# Patient Record
Sex: MALE | Race: WHITE | NOT HISPANIC OR LATINO | Employment: UNEMPLOYED | URBAN - METROPOLITAN AREA
[De-identification: names, ages, dates, MRNs, and addresses within clinical notes are randomized per-mention and may not be internally consistent; named-entity substitution may affect disease eponyms.]

---

## 2017-03-16 ENCOUNTER — ALLSCRIPTS OFFICE VISIT (OUTPATIENT)
Dept: OTHER | Facility: OTHER | Age: 32
End: 2017-03-16

## 2017-03-24 ENCOUNTER — ALLSCRIPTS OFFICE VISIT (OUTPATIENT)
Dept: OTHER | Facility: OTHER | Age: 32
End: 2017-03-24

## 2018-01-13 VITALS
RESPIRATION RATE: 18 BRPM | HEART RATE: 90 BPM | SYSTOLIC BLOOD PRESSURE: 138 MMHG | WEIGHT: 148.6 LBS | DIASTOLIC BLOOD PRESSURE: 84 MMHG | TEMPERATURE: 97.6 F | HEIGHT: 66 IN | BODY MASS INDEX: 23.88 KG/M2

## 2018-01-14 VITALS
TEMPERATURE: 97.8 F | RESPIRATION RATE: 18 BRPM | DIASTOLIC BLOOD PRESSURE: 80 MMHG | HEIGHT: 66 IN | BODY MASS INDEX: 23.75 KG/M2 | WEIGHT: 147.8 LBS | HEART RATE: 84 BPM | SYSTOLIC BLOOD PRESSURE: 140 MMHG | OXYGEN SATURATION: 98 %

## 2018-06-26 ENCOUNTER — APPOINTMENT (OUTPATIENT)
Dept: RADIOLOGY | Facility: CLINIC | Age: 33
End: 2018-06-26
Payer: COMMERCIAL

## 2018-06-26 ENCOUNTER — TRANSCRIBE ORDERS (OUTPATIENT)
Dept: RADIOLOGY | Facility: CLINIC | Age: 33
End: 2018-06-26

## 2018-06-26 ENCOUNTER — OFFICE VISIT (OUTPATIENT)
Dept: FAMILY MEDICINE CLINIC | Facility: CLINIC | Age: 33
End: 2018-06-26
Payer: COMMERCIAL

## 2018-06-26 VITALS
SYSTOLIC BLOOD PRESSURE: 120 MMHG | DIASTOLIC BLOOD PRESSURE: 70 MMHG | HEART RATE: 70 BPM | TEMPERATURE: 98.2 F | BODY MASS INDEX: 22.1 KG/M2 | HEIGHT: 67 IN | WEIGHT: 140.8 LBS | RESPIRATION RATE: 16 BRPM

## 2018-06-26 DIAGNOSIS — M54.50 BILATERAL LOW BACK PAIN WITHOUT SCIATICA, UNSPECIFIED CHRONICITY: Primary | ICD-10-CM

## 2018-06-26 DIAGNOSIS — M62.830 BACK MUSCLE SPASM: ICD-10-CM

## 2018-06-26 DIAGNOSIS — M54.6 LEFT-SIDED THORACIC BACK PAIN, UNSPECIFIED CHRONICITY: ICD-10-CM

## 2018-06-26 DIAGNOSIS — M54.50 BILATERAL LOW BACK PAIN WITHOUT SCIATICA, UNSPECIFIED CHRONICITY: ICD-10-CM

## 2018-06-26 PROCEDURE — 99214 OFFICE O/P EST MOD 30 MIN: CPT | Performed by: NURSE PRACTITIONER

## 2018-06-26 PROCEDURE — 3008F BODY MASS INDEX DOCD: CPT | Performed by: NURSE PRACTITIONER

## 2018-06-26 PROCEDURE — 72110 X-RAY EXAM L-2 SPINE 4/>VWS: CPT

## 2018-06-26 PROCEDURE — 72072 X-RAY EXAM THORAC SPINE 3VWS: CPT

## 2018-06-26 NOTE — PATIENT INSTRUCTIONS
Xray as ordered  Physical therapy as ordered  Aleve as needed  Ice/heat- alternate  Stretching exercises
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
## 2018-06-27 ENCOUNTER — TELEPHONE (OUTPATIENT)
Dept: FAMILY MEDICINE CLINIC | Facility: CLINIC | Age: 33
End: 2018-06-27

## 2018-06-27 NOTE — TELEPHONE ENCOUNTER
----- Message from [address] [place] [address] [employer] sent at 6/27/2018  9:03 AM EDT -----  Please call pt and advise that xrays normal  He should go to physical therapy as discussed and follow up with me in 4 weeks

## 2018-06-27 NOTE — TELEPHONE ENCOUNTER
----- Message from [address] [place] [address] [employer] sent at 6/27/2018  9:03 AM EDT -----  Please call pt and advise that xrays normal  He should go to physical therapy as discussed and follow up with me in 4 weeks

## 2018-06-28 ENCOUNTER — TELEPHONE (OUTPATIENT)
Dept: FAMILY MEDICINE CLINIC | Facility: CLINIC | Age: 33
End: 2018-06-28

## 2018-06-28 ENCOUNTER — EVALUATION (OUTPATIENT)
Dept: PHYSICAL THERAPY | Facility: CLINIC | Age: 33
End: 2018-06-28
Payer: COMMERCIAL

## 2018-06-28 DIAGNOSIS — G89.29 CHRONIC BILATERAL LOW BACK PAIN WITHOUT SCIATICA: Primary | ICD-10-CM

## 2018-06-28 DIAGNOSIS — M54.50 CHRONIC BILATERAL LOW BACK PAIN WITHOUT SCIATICA: Primary | ICD-10-CM

## 2018-06-28 PROCEDURE — G8982 BODY POS GOAL STATUS: HCPCS | Performed by: PHYSICAL THERAPIST

## 2018-06-28 PROCEDURE — 97161 PT EVAL LOW COMPLEX 20 MIN: CPT | Performed by: PHYSICAL THERAPIST

## 2018-06-28 PROCEDURE — G8981 BODY POS CURRENT STATUS: HCPCS | Performed by: PHYSICAL THERAPIST

## 2018-06-28 NOTE — TELEPHONE ENCOUNTER
----- Message from [address] [place] [address] [employer] sent at 6/27/2018  9:03 AM EDT -----  Please call pt and advise that xrays normal  He should go to physical therapy as discussed and follow up with me in 4 weeks

## 2018-06-28 NOTE — PROGRESS NOTES
PT Evaluation     Today's date: 2018  Patient name: Aguilar Parmar  : 1985  MRN: [id number]  Referring provider: Emily Torres MD  Dx:   Encounter Diagnosis     ICD-10-CM    1  Chronic bilateral low back pain without sciatica M54 5     G89 29                   Assessment  Impairments: abnormal or restricted ROM, impaired physical strength, lacks appropriate home exercise program, pain with function and poor body mechanics    Assessment details: Riana Patterson Toussaintpresents with signs and symptoms consistent with Chronic bilateral low back pain without sciatica  (primary encounter diagnosis), with loss of range of motion, strength and spinal stabilization  Presents with medium reactivity  Aguilar Parmar would benefit with physical therapy to address these impairments to return to prior level of function  Understanding of Dx/Px/POC: good   Prognosis: fair    Goals  STG  Initiate HEP  Able to maintain neutral posture 50% of time in 3 weeks  LTG  Independent with HEP  Able to maintain neutral posture 75% of time in 6 weeks    Plan  Planned therapy interventions: manual therapy, neuromuscular re-education, patient education, postural training, strengthening, therapeutic exercise, stretching and home exercise program  Frequency: 2x week  Duration in visits: 12  Duration in weeks: 6  Treatment plan discussed with: patient        Subjective Evaluation    History of Present Illness  Mechanism of injury: Patient reports having back pain for years, treated in past with chiropractic  Patient reports a commute 60 minutes, and works sitting  Does not do fitness exercise      Recurrent probem    Quality of life: good    Pain  Current pain rating: 3  At best pain ratin  At worst pain ratin  Quality: dull ache  Relieving factors: change in position  Aggravating factors: sitting, keyboarding and lifting  Progression: no change    Treatments  Current treatment: physical therapy  Patient Goals  Patient goals for therapy: decreased pain, improved balance, increased motion and independence with ADLs/IADLs          Objective     Static Posture     Head  Forward  Shoulders  Rounded  Thoracic Spine  Hyperkyphosis  Active Range of Motion     Lumbar   Flexion: 50 degrees   Extension: 35 degrees     Strength/Myotome Testing     Left Hip   Planes of Motion   Flexion: 5  Extension: 5  Abduction: 5  Adduction: 5    Right Hip   Planes of Motion   Flexion: 5  Extension: 5  Abduction: 5  Adduction: 5      Flowsheet Rows      Most Recent Value   PT/OT G-Codes   Current Score  61   Projected Score  79   FOTO information reviewed  Yes   Assessment Type  Evaluation   G code set  Changing & Maintaining Body Position   Changing and Maintaining Body Position Current Status ()  CJ   Changing and Maintaining Body Position Goal Status ()  CJ        Precautions: None    Daily Treatment Diary     Manual                                                                                   Exercise Diary  6/28            Angel day 1  3x            Posture instruction Perf                                                                                                                                                                                                                                                            Modalities

## 2018-07-02 ENCOUNTER — OFFICE VISIT (OUTPATIENT)
Dept: PHYSICAL THERAPY | Facility: CLINIC | Age: 33
End: 2018-07-02
Payer: COMMERCIAL

## 2018-07-02 DIAGNOSIS — M54.50 CHRONIC BILATERAL LOW BACK PAIN WITHOUT SCIATICA: Primary | ICD-10-CM

## 2018-07-02 DIAGNOSIS — G89.29 CHRONIC BILATERAL LOW BACK PAIN WITHOUT SCIATICA: Primary | ICD-10-CM

## 2018-07-02 NOTE — PROGRESS NOTES
Daily Note     Today's date: 2018  Patient name: Harman Tam  : 1985  MRN: [id number]  Referring provider: Adilene Mancilla MD  Dx:   Encounter Diagnosis     ICD-10-CM    1  Chronic bilateral low back pain without sciatica M54 5     G89 29                   Subjective: Pt reported C/C is tightness and pain between shoulder blades      Objective: See treatment diary below  Precautions: None    Daily Treatment Diary     Manual                                                                                   Exercise Diary             Angel day 1  3x            Posture instruction Perf  NuStep  10min L1           Da 4way walk outs  @#10 4x10           Da chest press  @#10 x20           Da squats  @#26 30x                                                                                                                                                                                                     Modalities                                                           Assessment: Tolerated treatment well  Patient would benefit from continued PT      Plan: Continue per plan of care

## 2018-07-02 NOTE — PROGRESS NOTES
Daily Note     Today's date: 2018  Patient name: Kevyn Monet  : 1985  MRN: [id number]  Referring provider: Adrianna Barclay MD  Dx:   Encounter Diagnosis     ICD-10-CM    1  Chronic bilateral low back pain without sciatica M54 5     D94 59      Note duplicated in error             Subjective: Pt reports C/C is tightness and pain between shoulder blades      Objective: See treatment diary below    There ex: 25 min  Nu Step 10 min  SL bridges 2 x 10  LE lowering 2 x 10  Rows with rotation 10 lbs 2 x 10  Unil chest press 10lbs 2 x 10  Assisted squats 40lbs 2 x 10    Manual 15 min  T/S Gr 4-5 mobs: prone, supine  2nd rib Gr 4-5 mobs in prone      Assessment: Tolerated treatment well  Patient would benefit from continued PT  Pt reported 0/10 pain after session, was able to achieve full thoracic ext and rotation pain free    Plan: Continue per plan of care

## 2018-07-05 ENCOUNTER — APPOINTMENT (OUTPATIENT)
Dept: PHYSICAL THERAPY | Facility: CLINIC | Age: 33
End: 2018-07-05
Payer: COMMERCIAL

## 2018-07-09 ENCOUNTER — APPOINTMENT (OUTPATIENT)
Dept: PHYSICAL THERAPY | Facility: CLINIC | Age: 33
End: 2018-07-09
Payer: COMMERCIAL

## 2018-07-12 ENCOUNTER — OFFICE VISIT (OUTPATIENT)
Dept: PHYSICAL THERAPY | Facility: CLINIC | Age: 33
End: 2018-07-12
Payer: COMMERCIAL

## 2018-07-12 DIAGNOSIS — G89.29 CHRONIC BILATERAL LOW BACK PAIN WITHOUT SCIATICA: Primary | ICD-10-CM

## 2018-07-12 DIAGNOSIS — M54.50 CHRONIC BILATERAL LOW BACK PAIN WITHOUT SCIATICA: Primary | ICD-10-CM

## 2018-07-12 PROCEDURE — 97110 THERAPEUTIC EXERCISES: CPT | Performed by: PHYSICAL THERAPIST

## 2018-07-12 PROCEDURE — 97112 NEUROMUSCULAR REEDUCATION: CPT | Performed by: PHYSICAL THERAPIST

## 2018-07-12 NOTE — PROGRESS NOTES
Daily Note     Today's date: 2018  Patient name: Macey Christopher  : 1985  MRN: [id number]  Referring provider: Arley Ley MD  Dx:   Encounter Diagnosis     ICD-10-CM    1  Chronic bilateral low back pain without sciatica M54 5     G89 29                   Subjective: C/o neck and low back stiffness  Objective: See treatment diary below      Assessment: Tolerated treatment well  Patient demonstrated fatigue post treatment and exhibited good technique with therapeutic exercises      Plan: Continue per plan of care      Precautions None    Specialty Daily Treatment Diary     Manual                                                     Exercise Diary         Nustep L 3 10 min       Neurac Supine pelvic lift 2x5       Neurac Supine Bridge 2x5       Neurac Prone Bridge 2x5       Neurac SDLY Hip ABD 2x5       Neurac SDLY Hip ADD 2x5       Neurac Supine Cervical retraction 2x5       neurac Supine scapular retraction 2x5       Neurac Sitting Scap retraction eith depression 2x5                                                                                                   Modalities

## 2018-07-16 ENCOUNTER — OFFICE VISIT (OUTPATIENT)
Dept: PHYSICAL THERAPY | Facility: CLINIC | Age: 33
End: 2018-07-16
Payer: COMMERCIAL

## 2018-07-16 DIAGNOSIS — G89.29 CHRONIC BILATERAL LOW BACK PAIN WITHOUT SCIATICA: Primary | ICD-10-CM

## 2018-07-16 DIAGNOSIS — M54.50 CHRONIC BILATERAL LOW BACK PAIN WITHOUT SCIATICA: Primary | ICD-10-CM

## 2018-07-16 PROCEDURE — 97110 THERAPEUTIC EXERCISES: CPT

## 2018-07-16 NOTE — PROGRESS NOTES
Daily Note     Today's date: 2018  Patient name: Camila Thomas  : 1985  MRN: [id number]  Referring provider: Scar Dhillon MD  Dx:   Encounter Diagnosis     ICD-10-CM    1  Chronic bilateral low back pain without sciatica M54 5     G89 29                   Subjective: The pain I came here for is practically gone, now I feel it mostly here ( R mid trap area)      Objective: See treatment diary below    Precautions None    Specialty Daily Treatment Diary     Manual                                                     Exercise Diary        Nustep L 3 10 min 10min L3      Neurac Supine pelvic lift 2x5       Neurac Supine Bridge 2x5       Neurac Prone Bridge 2x5       Neurac SDLY Hip ABD 2x5       Neurac SDLY Hip ADD 2x5       Neurac Supine Cervical retraction 2x5       neurac Supine scapular retraction 2x5       Neurac Sitting Scap retraction eith depression 2x5       tomas punch w/ rotation  @#10 x 20      tomas triceps  @#15 x30      tomas biceps  @#15 x30      tomas rows  @#25 x20              T/S rotation   S/L x5 reps B                                                  Modalities                                  Assessment: Tolerated treatment well  Patient would benefit from continued PT      Plan: Progress treatment as tolerated

## 2018-07-18 ENCOUNTER — OFFICE VISIT (OUTPATIENT)
Dept: FAMILY MEDICINE CLINIC | Facility: CLINIC | Age: 33
End: 2018-07-18
Payer: COMMERCIAL

## 2018-07-18 VITALS
RESPIRATION RATE: 12 BRPM | WEIGHT: 141 LBS | BODY MASS INDEX: 22.08 KG/M2 | SYSTOLIC BLOOD PRESSURE: 110 MMHG | DIASTOLIC BLOOD PRESSURE: 70 MMHG | TEMPERATURE: 98 F | HEART RATE: 72 BPM

## 2018-07-18 DIAGNOSIS — J02.9 PHARYNGITIS, UNSPECIFIED ETIOLOGY: Primary | ICD-10-CM

## 2018-07-18 DIAGNOSIS — J02.9 SORE THROAT: ICD-10-CM

## 2018-07-18 LAB — S PYO AG THROAT QL: NEGATIVE

## 2018-07-18 PROCEDURE — 87880 STREP A ASSAY W/OPTIC: CPT | Performed by: NURSE PRACTITIONER

## 2018-07-18 PROCEDURE — 1036F TOBACCO NON-USER: CPT | Performed by: NURSE PRACTITIONER

## 2018-07-18 PROCEDURE — 99214 OFFICE O/P EST MOD 30 MIN: CPT | Performed by: NURSE PRACTITIONER

## 2018-07-18 NOTE — PATIENT INSTRUCTIONS
Rapid strep test was negative in the office  Salt water gargles  Lozenges  Tylenol or Ibuprofen as needed  Fluids

## 2018-07-18 NOTE — PROGRESS NOTES
Assessment/Plan:    1  Pharyngitis, unspecified etiology  Rapid strep test was negative in the office  Salt water gargles  Lozenges  Tylenol or Ibuprofen as needed  Fluids   - POCT rapid strepA        Subjective:      Patient ID: Brandt Ruiz is a 34 y o  male who presents for sore throat  Sore throat for 3 days  Left side worse than right  No fever  No nasal congestion  No cough  ? Mild seasonal allergies  No sick contacts  The following portions of the patient's history were reviewed and updated as appropriate: allergies, current medications, past family history, past medical history, past social history, past surgical history and problem list     Review of Systems   Constitutional: Negative for fatigue and fever  HENT: Positive for sore throat and trouble swallowing  Negative for congestion, postnasal drip, rhinorrhea, sinus pain and sinus pressure  Respiratory: Negative for cough and shortness of breath  Gastrointestinal: Positive for nausea (mild initially, resolved)  Negative for diarrhea and vomiting  Musculoskeletal: Negative for myalgias  Skin: Negative for rash  Allergic/Immunologic: Positive for environmental allergies  Neurological: Negative for dizziness and headaches  Hematological: Negative for adenopathy  Objective:      /70 (BP Location: Left arm, Patient Position: Sitting, Cuff Size: Standard)   Pulse 72   Temp 98 °F (36 7 °C) (Tympanic)   Resp 12   Wt 64 kg (141 lb)   BMI 22 08 kg/m²          Physical Exam   Constitutional: He is oriented to person, place, and time  He appears well-developed and well-nourished  No distress  HENT:   TMS WNL  Turbinates not inflamed  Oropharynx with no erythema or exudate  No sinus tenderness to palpation    (+) PND  Rapid strep negative     Eyes: Right eye exhibits no discharge  Left eye exhibits no discharge  Cardiovascular: Normal rate, regular rhythm and normal heart sounds      No murmur heard   Pulmonary/Chest: Breath sounds normal  No respiratory distress  He has no wheezes  Lymphadenopathy:     He has no cervical adenopathy  Neurological: He is alert and oriented to person, place, and time  Skin: Skin is warm and dry  No rash noted  No erythema  Psychiatric: He has a normal mood and affect  His behavior is normal  Judgment and thought content normal    Vitals reviewed

## 2018-12-10 ENCOUNTER — OFFICE VISIT (OUTPATIENT)
Dept: FAMILY MEDICINE CLINIC | Facility: CLINIC | Age: 33
End: 2018-12-10
Payer: COMMERCIAL

## 2018-12-10 VITALS
TEMPERATURE: 98.5 F | HEART RATE: 65 BPM | HEIGHT: 67 IN | BODY MASS INDEX: 23.98 KG/M2 | OXYGEN SATURATION: 94 % | DIASTOLIC BLOOD PRESSURE: 56 MMHG | WEIGHT: 152.8 LBS | SYSTOLIC BLOOD PRESSURE: 104 MMHG

## 2018-12-10 DIAGNOSIS — T24.211A SECOND DEGREE BURN OF RIGHT THIGH, INITIAL ENCOUNTER: Primary | ICD-10-CM

## 2018-12-10 PROCEDURE — 3008F BODY MASS INDEX DOCD: CPT | Performed by: NURSE PRACTITIONER

## 2018-12-10 PROCEDURE — 1036F TOBACCO NON-USER: CPT | Performed by: NURSE PRACTITIONER

## 2018-12-10 PROCEDURE — 99213 OFFICE O/P EST LOW 20 MIN: CPT | Performed by: NURSE PRACTITIONER
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
## 2018-12-13 ENCOUNTER — TELEPHONE (OUTPATIENT)
Dept: FAMILY MEDICINE CLINIC | Facility: CLINIC | Age: 33
End: 2018-12-13

## 2019-08-23 ENCOUNTER — APPOINTMENT (OUTPATIENT)
Dept: RADIOLOGY | Facility: CLINIC | Age: 34
End: 2019-08-23
Payer: OTHER MISCELLANEOUS

## 2019-08-23 DIAGNOSIS — S67.22XA CRUSHING INJURY OF LEFT HAND, INITIAL ENCOUNTER: ICD-10-CM

## 2019-08-23 PROCEDURE — 73130 X-RAY EXAM OF HAND: CPT

## 2019-10-04 ENCOUNTER — TELEPHONE (OUTPATIENT)
Dept: FAMILY MEDICINE CLINIC | Facility: CLINIC | Age: 34
End: 2019-10-04

## 2021-03-02 ENCOUNTER — TELEPHONE (OUTPATIENT)
Dept: FAMILY MEDICINE CLINIC | Facility: CLINIC | Age: 36
End: 2021-03-02

## 2021-03-04 NOTE — TELEPHONE ENCOUNTER
Called and spoke to patient  He stated he moved away and will no longer be coming to see Yvonne Diallo as his PCP  Please remove Yvonne Diallo as his PCP

## 2021-03-09 NOTE — TELEPHONE ENCOUNTER
03/08/21 8:39 PM     Thank you for your request  Your request has been received, reviewed, and the patient chart updated  The PCP has successfully been removed with a patient attribution note  This message will now be completed      Thank you  Leda Szymanski

## 2022-10-16 ENCOUNTER — HOSPITAL ENCOUNTER (EMERGENCY)
Facility: HOSPITAL | Age: 37
Discharge: HOME | End: 2022-10-16
Attending: EMERGENCY MEDICINE
Payer: COMMERCIAL

## 2022-10-16 ENCOUNTER — APPOINTMENT (EMERGENCY)
Dept: RADIOLOGY | Facility: HOSPITAL | Age: 37
End: 2022-10-16
Payer: COMMERCIAL

## 2022-10-16 VITALS
SYSTOLIC BLOOD PRESSURE: 114 MMHG | OXYGEN SATURATION: 97 % | WEIGHT: 147 LBS | BODY MASS INDEX: 23.02 KG/M2 | RESPIRATION RATE: 15 BRPM | TEMPERATURE: 97.6 F | DIASTOLIC BLOOD PRESSURE: 69 MMHG | HEART RATE: 57 BPM

## 2022-10-16 DIAGNOSIS — R55 SYNCOPE: Primary | ICD-10-CM

## 2022-10-16 LAB
ALBUMIN SERPL BCP-MCNC: 3.9 G/DL (ref 3.5–5)
ALP SERPL-CCNC: 42 U/L (ref 46–116)
ALT SERPL W P-5'-P-CCNC: 20 U/L (ref 12–78)
ANION GAP SERPL CALCULATED.3IONS-SCNC: 8 MMOL/L (ref 4–13)
AST SERPL W P-5'-P-CCNC: 15 U/L (ref 5–45)
BASOPHILS # BLD AUTO: 0.04 THOUSANDS/ÂΜL (ref 0–0.1)
BASOPHILS NFR BLD AUTO: 0 % (ref 0–1)
BILIRUB SERPL-MCNC: 0.4 MG/DL (ref 0.2–1)
BUN SERPL-MCNC: 19 MG/DL (ref 5–25)
CALCIUM SERPL-MCNC: 7.8 MG/DL (ref 8.3–10.1)
CHLORIDE SERPL-SCNC: 102 MMOL/L (ref 96–108)
CO2 SERPL-SCNC: 26 MMOL/L (ref 21–32)
CREAT SERPL-MCNC: 0.86 MG/DL (ref 0.6–1.3)
EOSINOPHIL # BLD AUTO: 0.24 THOUSAND/ÂΜL (ref 0–0.61)
EOSINOPHIL NFR BLD AUTO: 2 % (ref 0–6)
ERYTHROCYTE [DISTWIDTH] IN BLOOD BY AUTOMATED COUNT: 11.9 % (ref 11.6–15.1)
GFR SERPL CREATININE-BSD FRML MDRD: 112 ML/MIN/1.73SQ M
GLUCOSE SERPL-MCNC: 102 MG/DL (ref 65–140)
GLUCOSE SERPL-MCNC: 96 MG/DL (ref 65–140)
HCT VFR BLD AUTO: 36.7 % (ref 36.5–49.3)
HGB BLD-MCNC: 12.7 G/DL (ref 12–17)
IMM GRANULOCYTES # BLD AUTO: 0.03 THOUSAND/UL (ref 0–0.2)
IMM GRANULOCYTES NFR BLD AUTO: 0 % (ref 0–2)
LYMPHOCYTES # BLD AUTO: 2.42 THOUSANDS/ÂΜL (ref 0.6–4.47)
LYMPHOCYTES NFR BLD AUTO: 23 % (ref 14–44)
MCH RBC QN AUTO: 31.1 PG (ref 26.8–34.3)
MCHC RBC AUTO-ENTMCNC: 34.6 G/DL (ref 31.4–37.4)
MCV RBC AUTO: 90 FL (ref 82–98)
MONOCYTES # BLD AUTO: 0.94 THOUSAND/ÂΜL (ref 0.17–1.22)
MONOCYTES NFR BLD AUTO: 9 % (ref 4–12)
NEUTROPHILS # BLD AUTO: 6.71 THOUSANDS/ÂΜL (ref 1.85–7.62)
NEUTS SEG NFR BLD AUTO: 66 % (ref 43–75)
NRBC BLD AUTO-RTO: 0 /100 WBCS
PLATELET # BLD AUTO: 209 THOUSANDS/UL (ref 149–390)
PMV BLD AUTO: 9.4 FL (ref 8.9–12.7)
POTASSIUM SERPL-SCNC: 3.6 MMOL/L (ref 3.5–5.3)
PROT SERPL-MCNC: 6.6 G/DL (ref 6.4–8.4)
RBC # BLD AUTO: 4.08 MILLION/UL (ref 3.88–5.62)
SODIUM SERPL-SCNC: 136 MMOL/L (ref 135–147)
WBC # BLD AUTO: 10.38 THOUSAND/UL (ref 4.31–10.16)

## 2022-10-16 PROCEDURE — 36415 COLL VENOUS BLD VENIPUNCTURE: CPT | Performed by: EMERGENCY MEDICINE

## 2022-10-16 PROCEDURE — 85025 COMPLETE CBC W/AUTO DIFF WBC: CPT | Performed by: EMERGENCY MEDICINE

## 2022-10-16 PROCEDURE — 99285 EMERGENCY DEPT VISIT HI MDM: CPT | Performed by: EMERGENCY MEDICINE

## 2022-10-16 PROCEDURE — 70450 CT HEAD/BRAIN W/O DYE: CPT

## 2022-10-16 PROCEDURE — 93005 ELECTROCARDIOGRAM TRACING: CPT

## 2022-10-16 PROCEDURE — 96360 HYDRATION IV INFUSION INIT: CPT

## 2022-10-16 PROCEDURE — 99284 EMERGENCY DEPT VISIT MOD MDM: CPT

## 2022-10-16 PROCEDURE — G1004 CDSM NDSC: HCPCS

## 2022-10-16 PROCEDURE — 80053 COMPREHEN METABOLIC PANEL: CPT | Performed by: EMERGENCY MEDICINE

## 2022-10-16 PROCEDURE — 82948 REAGENT STRIP/BLOOD GLUCOSE: CPT

## 2022-10-16 RX ADMIN — SODIUM CHLORIDE 1000 ML: 0.9 INJECTION, SOLUTION INTRAVENOUS at 02:34

## 2022-10-16 NOTE — ED PROVIDER NOTES
Final Diagnosis:  1  Syncope        Chief Complaint   Patient presents with   • Syncope     Patient reports watching TV, stood up and felt like he was going to have a panic attack, girlfriend said he passed out and had clenched fists, did vomit and given zofran prior to arrival, patient alert and oriented at time of arrival ,      HPI  Patient presents after an apparent syncopal episode  He was watching TV  He'd smoked some marijuana  He felt like he was going to hve a panic attack  Stood up and then got lightheaded and passed out  Fists clinched but no other tonic clonic per GF  Woke up  About 10 seconds to awareness and return to baseline  No post ictal  Threw up  naseuous en route given zofran  On arrival with a little headache  Nausea imrpoved  Notes no prior contributory symptoms, normal last few d  Similar thing happened to him previously  No w/u at that time  obs for several hours here on monitor with no recurrence or arrythmia beyond mild sinus bradycardia      - No language barrier    - History obtained from patient  - There are no limitations to the history obtained  - Previous charting underwent limited review with attention to last ED visits, labs, ekgs, and prior imaging  PMH:   has a past medical history of Anterior cervical adenopathy, Anxiety, and Lyme disease  PSH:   has a past surgical history that includes [place] tooth extraction  Social History:    Tobacco Use: High Risk   • Smoking Tobacco Use: Former Smoker   • Smokeless Tobacco Use: Current User     Alcohol Use: Not on file     Patient with no illicit use    ROS:    Pertinent positives/negatives:   Review of Systems   Gastrointestinal: Positive for nausea and vomiting  Neurological: Positive for syncope  CONSTITUTIONAL:  No dizziness  No weakness  No unexpected weight loss  EYES:  No pain, erythema, or discharge  No loss of vision  ENT:  No tinnitus, decreased hearing  No epistaxis/purulent drainage   No voice change, airway closing, trismus  CARDIOVASCULAR:  No chest pain  No palpitations  No new lower extremity edema  RESPIRATORY:  No purulent cough  No hemoptysis  No dyspnea  No paroxysmal nocturnal dyspnea  No stridor, audible wheezing bedside  GASTROINTESTINAL:  Normal appetite  No vomiting, diarrhea  No pain  No bloating  No melena  GENITOURINARY:  No frequency, urgency, nocturia  No hematuria or dysuria  No discharge  No sores/adenopathy  MUSCULOSKELETAL:  No arthralgias or myalgias that are new  INTEGUMENTARY:  No swelling  No unexpected contusions  No abrasions  No lymphangitis  NEUROLOGIC:  No meningismus  No numbness of the extremities  No new focal weakness  No postural instability  PSYCHIATRIC:  No SI HI AVH  HEMATOLOGICAL:  No bleeding  No petechiae  No bruising  ALLERGIES:  No urticaria  No sudden abd cramping  No stridor  PE:     Physical exam highlights:   Physical Exam       Vitals:    10/16/22 0222 10/16/22 0223 10/16/22 0227 10/16/22 0300   BP: 105/67 108/71 114/69    BP Location: Right arm Right arm Right arm    Pulse: (!) 53 (!) 50 69 57   Resp: 20 20 20 15   Temp:       TempSrc:       SpO2:    97%   Weight:         Vitals reviewed by me  Nursing note reviewed  Chaperone present for all sensitive exam   Const: No acute distress  Alert  Nontoxic  Not diaphoretic  HEENT: External ears normal  No protrusion drainage swelling  Nose normal  No drainage/traumatic deformity  MMM  Mouth with baseline/symmetric movement  No trismus  Eyes: No squinting  No icterus  Tracks through the room with normal EOM  No tearing/swelling/drainage  Neck: ROM normal  No rigidity  No meningismus  Cards: Rate as per vitals  Compared to monitor sinus unless documented above  Regular  Well perfused  Pulm: able to verbalize without additional effort  Effort and excursion normal  No disress  No audible wheezing/ stridor  Normal resp rate  Abd: No distension beyond baseline  No fluctuant wave   Patient without peritoneal pain with shifting/bumping the bed  MSK: ROM normal and baseline  No deformity  Skin: No new rashes visible  Well perfused  Neuro: Nonfocal  Baseline  CN grossly intact  Moving all four with coordination  Psych: Normal behavior and affect  A:  - Nursing note reviewed  Ddx and MDM  Syncope vs seizure - no prior  Monitor , obs, ekg    Basic labs for lytes    Concerned about trauma to his head  Has ha  Two episodes of vomiting   CT head wo    marolandojuana side effect? ED Course as of 10/16/22 0431   Sun Oct 16, 2022   0145 Procedure Note: EKG  Date/Time: 10/16/22 1:45 AM   Interpreted by: Alissa Grullon  Indications / Diagnosis: syncope  ECG reviewed by me, the ED Provider: yes   The EKG demonstrates:  Rhythm: sinus  ncianor 53  Intervals: normal intervals  Axis: normal axis  QRS/Blocks: normal QRS  ST Changes: No acute ST Changes, no STD/ISABEL  T wave changes: none         CT head without contrast   Final Result      No acute intracranial hemorrhage, midline shift, or mass effect                    Workstation performed: [id number]           Orders Placed This Encounter   Procedures   • CT head without contrast   • CBC and differential   • Comprehensive metabolic panel   • Orthostatic Vital Signs   • Holter monitor   • ECG 12 lead     Labs Reviewed   CBC AND DIFFERENTIAL - Abnormal       Result Value Ref Range Status    WBC 10 38 (*) 4 31 - 10 16 Thousand/uL Final    RBC 4 08  3 88 - 5 62 Million/uL Final    Hemoglobin 12 7  12 0 - 17 0 g/dL Final    Hematocrit 36 7  36 5 - 49 3 % Final    MCV 90  82 - 98 fL Final    MCH 31 1  26 8 - 34 3 pg Final    MCHC 34 6  31 4 - 37 4 g/dL Final    RDW 11 9  11 6 - 15 1 % Final    MPV 9 4  8 9 - 12 7 fL Final    Platelets 432  041 - 390 Thousands/uL Final    nRBC 0  /100 WBCs Final    Neutrophils Relative 66  43 - 75 % Final    Immat GRANS % 0  0 - 2 % Final    Lymphocytes Relative 23  14 - 44 % Final    Monocytes Relative 9  4 - 12 % Final    Eosinophils Relative 2  0 - 6 % Final    Basophils Relative 0  0 - 1 % Final    Neutrophils Absolute 6 71  1 85 - 7 62 Thousands/µL Final    Immature Grans Absolute 0 03  0 00 - 0 20 Thousand/uL Final    Lymphocytes Absolute 2 42  0 60 - 4 47 Thousands/µL Final    Monocytes Absolute 0 94  0 17 - 1 22 Thousand/µL Final    Eosinophils Absolute 0 24  0 00 - 0 61 Thousand/µL Final    Basophils Absolute 0 04  0 00 - 0 10 Thousands/µL Final   COMPREHENSIVE METABOLIC PANEL - Abnormal    Sodium 136  135 - 147 mmol/L Final    Potassium 3 6  3 5 - 5 3 mmol/L Final    Chloride 102  96 - 108 mmol/L Final    CO2 26  21 - 32 mmol/L Final    ANION GAP 8  4 - 13 mmol/L Final    BUN 19  5 - 25 mg/dL Final    Creatinine 0 86  0 60 - 1 30 mg/dL Final    Comment: Standardized to IDMS reference method    Glucose 102  65 - 140 mg/dL Final    Comment: If the patient is fasting, the ADA then defines impaired fasting glucose as > 100 mg/dL and diabetes as > or equal to 123 mg/dL  Specimen collection should occur prior to Sulfasalazine administration due to the potential for falsely depressed results  Specimen collection should occur prior to Sulfapyridine administration due to the potential for falsely elevated results  Calcium 7 8 (*) 8 3 - 10 1 mg/dL Final    AST 15  5 - 45 U/L Final    Comment: Specimen collection should occur prior to Sulfasalazine administration due to the potential for falsely depressed results  ALT 20  12 - 78 U/L Final    Comment: Specimen collection should occur prior to Sulfasalazine administration due to the potential for falsely depressed results  Alkaline Phosphatase 42 (*) 46 - 116 U/L Final    Total Protein 6 6  6 4 - 8 4 g/dL Final    Albumin 3 9  3 5 - 5 0 g/dL Final    Total Bilirubin 0 40  0 20 - 1 00 mg/dL Final    Comment: Use of this assay is not recommended for patients undergoing treatment with eltrombopag due to the potential for falsely elevated results      eGFR 112 ml/min/1 73sq m Final    Narrative:     National Kidney Disease Foundation guidelines for Chronic Kidney Disease (CKD):   •  Stage 1 with normal or high GFR (GFR > 90 mL/min/1 73 square meters)  •  Stage 2 Mild CKD (GFR = 60-89 mL/min/1 73 square meters)  •  Stage 3A Moderate CKD (GFR = 45-59 mL/min/1 73 square meters)  •  Stage 3B Moderate CKD (GFR = 30-44 mL/min/1 73 square meters)  •  Stage 4 Severe CKD (GFR = 15-29 mL/min/1 73 square meters)  •  Stage 5 End Stage CKD (GFR <15 mL/min/1 73 square meters)  Note: GFR calculation is accurate only with a steady state creatinine   POCT GLUCOSE - Normal    POC Glucose 96  65 - 140 mg/dl Final       Final Diagnosis:  1  Syncope        P:  - hospital tx includes   Medications   sodium chloride 0 9 % bolus 1,000 mL (0 mL Intravenous Stopped 10/16/22 0325)         - disposition  Time reflects when diagnosis was documented in both MDM as applicable and the Disposition within this note     Time User Action Codes Description Comment    10/16/2022  3:07 AM Nicolette Medel Add [R55] Syncope       ED Disposition     ED Disposition   Discharge    Condition   Stable    Date/Time   Sun Oct 16, 2022  3:07 AM    Comment   Bebe Olmsted Toussaint discharge to home/self care  Follow-up Information     Follow up With Specialties Details Why Contact Info Additional Information    [facility] Schedule an appointment as soon as possible for a visit  f/u results holter monitor [employer]  [place] [facility] [address] [id number]  [address] [place] [address], [employer] [address] [place], [place], [facility]          - patient will call their PCP to let them know they were in the emergency department  We discuss return precautions       - additional tx intended, if consistent with primary provider:  - patient to follow with :       There are no discharge medications for this patient  Outpatient Discharge Orders   Holter monitor   Standing Status: Future Standing Exp  Date: 10/16/26     None       Portions of the record may have been created with voice recognition software  Occasional wrong word or "sound a like" substitutions may have occurred due to the inherent limitations of voice recognition software  Read the chart carefully and recognize, using context, where substitutions have occurred      Electronically signed by:  MD Ezekiel Shearer MD  10/16/22 0160

## 2022-10-17 LAB
ATRIAL RATE: 53 BPM
P AXIS: 76 DEGREES
PR INTERVAL: 166 MS
QRS AXIS: 68 DEGREES
QRSD INTERVAL: 84 MS
QT INTERVAL: 400 MS
QTC INTERVAL: 375 MS
T WAVE AXIS: 71 DEGREES
VENTRICULAR RATE: 53 BPM

## 2022-10-17 PROCEDURE — 93010 ELECTROCARDIOGRAM REPORT: CPT | Performed by: INTERNAL MEDICINE

## 2022-11-15 ENCOUNTER — OFFICE VISIT (OUTPATIENT)
Dept: FAMILY MEDICINE CLINIC | Facility: CLINIC | Age: 37
End: 2022-11-15

## 2022-11-15 VITALS
SYSTOLIC BLOOD PRESSURE: 112 MMHG | DIASTOLIC BLOOD PRESSURE: 64 MMHG | HEART RATE: 73 BPM | TEMPERATURE: 97 F | OXYGEN SATURATION: 99 % | BODY MASS INDEX: 23.7 KG/M2 | RESPIRATION RATE: 16 BRPM | WEIGHT: 151 LBS | HEIGHT: 67 IN

## 2022-11-15 DIAGNOSIS — R00.1 SINUS BRADYCARDIA: ICD-10-CM

## 2022-11-15 DIAGNOSIS — Z13.29 SCREENING FOR THYROID DISORDER: ICD-10-CM

## 2022-11-15 DIAGNOSIS — Z76.89 ENCOUNTER TO ESTABLISH CARE: ICD-10-CM

## 2022-11-15 DIAGNOSIS — R55 SYNCOPE, UNSPECIFIED SYNCOPE TYPE: Primary | ICD-10-CM

## 2022-11-15 DIAGNOSIS — Z11.59 NEED FOR HEPATITIS C SCREENING TEST: ICD-10-CM

## 2022-11-15 DIAGNOSIS — Z11.4 SCREENING FOR HIV (HUMAN IMMUNODEFICIENCY VIRUS): ICD-10-CM

## 2022-11-15 DIAGNOSIS — R79.89 LOW SERUM CALCIUM: ICD-10-CM

## 2022-11-15 DIAGNOSIS — F41.9 ANXIETY: ICD-10-CM

## 2022-11-15 DIAGNOSIS — Z13.220 SCREENING FOR LIPID DISORDERS: ICD-10-CM

## 2022-11-15 NOTE — PROGRESS NOTES
Name: Jennifer Hampton      : 1985      MRN: [id number]  Encounter Provider: KARINE Nash  Encounter Date: 11/15/2022   Encounter department: [address] [place] [address]  Syncope, unspecified syncope type  Etiology unclear  Reaction to marijuana? Orthostasis? Vasovagal?   holter monitor ordered in ED and pt encouraged to arrange  Will check labs and refer to cardio  - TSH, 3rd generation  - Ambulatory Referral to Cardiology; Future  - Comprehensive metabolic panel    2  Anxiety  Stable  Stress management  Activities to divert attention when possible  Conscious breathing techniques as discussed  Coping mechanisms and strategies vary from person to person so try to utilize strategies that you think may work for you (such as meditation, music, etc  )  - TSH, 3rd generation    3  Encounter to establish care  Heart healthy, carbohydrate controlled diet- limit red meat, limit saturated fat, moderate salt intake, limit junk food, etc    Regular exercise  Stress management  Routine labwork and screenings as ordered  - Lipid panel    4  Screening for HIV (human immunodeficiency virus)  - Human Immunodeficiency Virus 1/2 Antigen / Antibody ( Fourth Generation) with Reflex Testing    5  Need for hepatitis C screening test  - Hepatitis C antibody    6  Screening for thyroid disorder  - TSH, 3rd generation    7  Sinus bradycardia  Noted on EKG in ED  Holter ordered and to be done  Will refer to cardio for complete eval    - TSH, 3rd generation  - Ambulatory Referral to Cardiology; Future    8  Screening for lipid disorders  - Lipid panel    9  Low serum calcium  Noted on ED labs  Will repeat labs   - Comprehensive metabolic panel       BMI Counseling: Body mass index is 23 65 kg/m²  BMI is wnl  Counseled on well balanced diet and regular exericse  Depression Screening Follow-up Plan: Patient's depression screening was negative with a PHQ-2 score of 0  Clinically patient does not have depression  No treatment is required  Subjective      Here to re-establish care and follow up from ED visit  Last seen in office 2018  [address] only lyme and anxiety  Recent ED visit on 10/16 for syncopal episode which occurred after smoking marijuana at home  Test all negative  Holter monitor ordered in ED , not done  PMH, meds, allergies, SH, FH reviewed  SH: lives with girlfriend  Vapes  Intermittent use of marijuana  No etoh  No other recreational drugs  Current medications: None  Current issues include: 2 episodes passing out within past 4 months  Most recent episode occurred after smoking marijuana but not with first  Both times, felt like panic attack and then passed out  No loss of bladder or bowel control  Woke up aware of surroundings  Girlfriend stated that he got up walked around, squatted and "dropped" to ground and "passed out " immediately after standing  Vomited after being aroused  No sob or chest pain prior  Denies any CAD family history  History of anxiety and ptsd  Intermittent panic attacks, rare  Feeling okay at this time, no recurrence of symptoms      Review of Systems   Constitutional: Negative for fatigue and unexpected weight change  HENT: Negative for congestion, sinus pressure and sinus pain  Eyes: Negative for visual disturbance  Wears glasses   Respiratory: Negative for chest tightness, shortness of breath and wheezing  Cardiovascular: Negative for chest pain and palpitations  Gastrointestinal: Negative for abdominal pain, diarrhea, nausea and vomiting  Endocrine: Negative for polydipsia and polyuria  Genitourinary: Negative for frequency and urgency  Musculoskeletal: Positive for arthralgias (intermittent ankle)  Skin: Negative for color change and pallor  Allergic/Immunologic: Negative for immunocompromised state  Neurological: Negative for dizziness, seizures and headaches     Psychiatric/Behavioral: Negative for dysphoric mood  The patient is nervous/anxious  No current outpatient medications on file prior to visit  Objective   10/16/2022 labs:  Sodium 135 - 147 mmol/L 136    Potassium 3 5 - 5 3 mmol/L 3 6    Chloride 96 - 108 mmol/L 102    CO2 21 - 32 mmol/L 26    ANION GAP 4 - 13 mmol/L 8    BUN 5 - 25 mg/dL 19    Creatinine 0 60 - 1 30 mg/dL 0 86    Comment: Standardized to IDMS reference method   Glucose 65 - 140 mg/dL 102    Comment: If the patient is fasting, the ADA then defines impaired fasting glucose as > 100 mg/dL and diabetes as > or equal to 123 mg/dL  Specimen collection should occur prior to Sulfasalazine administration due to the potential for falsely depressed results  Specimen collection should occur prior to Sulfapyridine administration due to the potential for falsely elevated results  Calcium 8 3 - 10 1 mg/dL 7 8 Low     AST 5 - 45 U/L 15    Comment: Specimen collection should occur prior to Sulfasalazine administration due to the potential for falsely depressed results  ALT 12 - 78 U/L 20    Comment: Specimen collection should occur prior to Sulfasalazine administration due to the potential for falsely depressed results  Alkaline Phosphatase 46 - 116 U/L 42 Low     Total Protein 6 4 - 8 4 g/dL 6 6    Albumin 3 5 - 5 0 g/dL 3 9    Total Bilirubin 0 20 - 1 00 mg/dL 0 40    Comment: Use of this assay is not recommended for patients undergoing treatment with eltrombopag due to the potential for falsely elevated results     eGFR ml/min/1 73sq m 112                    WBC 4 31 - 10 16 Thousand/uL 10 38 High     RBC 3 88 - 5 62 Million/uL 4 08    Hemoglobin 12 0 - 17 0 g/dL 12 7    Hematocrit 36 5 - 49 3 % 36 7    MCV 82 - 98 fL 90    MCH 26 8 - 34 3 pg 31 1    MCHC 31 4 - 37 4 g/dL 34 6    RDW 11 6 - 15 1 % 11 9    MPV 8 9 - 12 7 fL 9 4    Platelets 210 - 954 Thousands/uL 209      POC Glucose 65 - 140 mg/dl 96      CT head  IMPRESSION:   No acute intracranial hemorrhage, midline shift, or mass effect  EKG : SB, rate 53, otherwise normal EKG, no previous EKG for comparison    ED records reviewed 10/16/2022, [facility]  1  Syncope               Chief Complaint   Patient presents with   • Syncope       Patient reports watching TV, stood up and felt like he was going to have a panic attack, girlfriend said he passed out and had clenched fists, did vomit and given zofran prior to arrival, patient alert and oriented at time of arrival ,       HPI  Patient presents after an apparent syncopal episode  He was watching TV  He'd smoked some marijuana  He felt like he was going to hve a panic attack  Stood up and then got lightheaded and passed out  Fists clinched but no other tonic clonic per GF  Woke up  About 10 seconds to awareness and return to baseline  No post ictal  Threw up  naseuous en route given zofran  On arrival with a little headache  Nausea imrpoved  Notes no prior contributory symptoms, normal last few d  Similar thing happened to him previously  No w/u at that time  obs for several hours here on monitor with no recurrence or arrythmia beyond mild sinus bradycardia     Ddx and MDM  Syncope vs seizure - no prior  Monitor , obs, ekg     Basic labs for vern     Concerned about trauma to his head  Has ha  Two episodes of vomiting   CT head wo   mairjuana side effect? Reviewed lab/diagnostic results with pt including both normal and abnormal findings  In depth counseling and instructions given  All questions answered during visit  /64   Pulse 73   Temp (!) 97 °F (36 1 °C) (Temporal)   Resp 16   Ht 5' 7" (1 702 m)   Wt 68 5 kg (151 lb)   SpO2 99%   BMI 23 65 kg/m²     Physical Exam  Vitals reviewed  Constitutional:       General: He is not in acute distress  Appearance: He is normal weight  He is not ill-appearing  Eyes:      Extraocular Movements: Extraocular movements intact  Pupils: Pupils are equal, round, and reactive to light     Cardiovascular: Rate and Rhythm: Normal rate and regular rhythm  Pulmonary:      Effort: Pulmonary effort is normal  No respiratory distress  Breath sounds: Normal breath sounds  No wheezing or rales  Abdominal:      General: There is no distension  Palpations: Abdomen is soft  Musculoskeletal:      Cervical back: Normal range of motion and neck supple  Skin:     General: Skin is warm and dry  Coloration: Skin is not jaundiced or pale  Neurological:      General: No focal deficit present  Mental Status: He is alert  Cranial Nerves: No cranial nerve deficit  Sensory: No sensory deficit  Psychiatric:         Mood and Affect: Mood normal          Behavior: Behavior normal          Thought Content:  Thought content normal          Judgment: Judgment normal        Ирина Fajardo

## 2022-11-15 NOTE — PATIENT INSTRUCTIONS
Schedule holter monitor as ordered  Schedule appt with cardiology as discussed  Will check labs  Call or return to office if symptoms worsen or if new symptoms develop

## 2022-11-18 ENCOUNTER — CONSULT (OUTPATIENT)
Dept: CARDIOLOGY CLINIC | Facility: CLINIC | Age: 37
End: 2022-11-18

## 2022-11-18 VITALS
HEART RATE: 74 BPM | TEMPERATURE: 98.6 F | DIASTOLIC BLOOD PRESSURE: 62 MMHG | SYSTOLIC BLOOD PRESSURE: 92 MMHG | HEIGHT: 67 IN | WEIGHT: 149 LBS | OXYGEN SATURATION: 98 % | BODY MASS INDEX: 23.39 KG/M2

## 2022-11-18 DIAGNOSIS — R00.1 SINUS BRADYCARDIA: ICD-10-CM

## 2022-11-18 DIAGNOSIS — R55 SYNCOPE, UNSPECIFIED SYNCOPE TYPE: Primary | ICD-10-CM

## 2022-11-18 LAB
ALBUMIN SERPL-MCNC: 4.6 G/DL (ref 4–5)
ALBUMIN/GLOB SERPL: 2.3 {RATIO} (ref 1.2–2.2)
ALP SERPL-CCNC: 49 IU/L (ref 44–121)
ALT SERPL-CCNC: 10 IU/L (ref 0–44)
AST SERPL-CCNC: 13 IU/L (ref 0–40)
BILIRUB SERPL-MCNC: 0.2 MG/DL (ref 0–1.2)
BUN SERPL-MCNC: 14 MG/DL (ref 6–20)
BUN/CREAT SERPL: 19 (ref 9–20)
CALCIUM SERPL-MCNC: 8.5 MG/DL (ref 8.7–10.2)
CHLORIDE SERPL-SCNC: 104 MMOL/L (ref 96–106)
CHOLEST SERPL-MCNC: 142 MG/DL (ref 100–199)
CHOLEST/HDLC SERPL: 3.9 RATIO (ref 0–5)
CO2 SERPL-SCNC: 22 MMOL/L (ref 20–29)
CREAT SERPL-MCNC: 0.72 MG/DL (ref 0.76–1.27)
EGFR: 122 ML/MIN/1.73
GLOBULIN SER-MCNC: 2 G/DL (ref 1.5–4.5)
GLUCOSE SERPL-MCNC: 90 MG/DL (ref 70–99)
HCV AB S/CO SERPL IA: <0.1 S/CO RATIO (ref 0–0.9)
HDLC SERPL-MCNC: 36 MG/DL
HIV 1+2 AB+HIV1 P24 AG SERPL QL IA: NON REACTIVE
LDLC SERPL CALC-MCNC: 90 MG/DL (ref 0–99)
POTASSIUM SERPL-SCNC: 4.2 MMOL/L (ref 3.5–5.2)
PROT SERPL-MCNC: 6.6 G/DL (ref 6–8.5)
SL AMB VLDL CHOLESTEROL CALC: 16 MG/DL (ref 5–40)
SODIUM SERPL-SCNC: 140 MMOL/L (ref 134–144)
TRIGL SERPL-MCNC: 80 MG/DL (ref 0–149)
TSH SERPL DL<=0.005 MIU/L-ACNC: 0.79 UIU/ML (ref 0.45–4.5)

## 2022-11-18 RX ORDER — ASCORBATE CALCIUM 500 MG
500 TABLET ORAL DAILY
COMMUNITY

## 2022-11-18 RX ORDER — MULTIVITAMIN
1 TABLET ORAL DAILY
COMMUNITY

## 2022-11-18 NOTE — PROGRESS NOTES
[facility]  [address] [place] [address] [address]   [place], [address]    Cardiology Consultation    Freedom Castaneda  [id number]  1985      Consult for: Syncope  Appreciate consult by: KARINE Craft      Discussion/Summary:     1  Syncope, unspecified syncope type  Ambulatory Referral to Cardiology    POCT ECG      2  Sinus bradycardia  Ambulatory Referral to Cardiology    POCT ECG         Patient with 2 episodes of syncope which occurred during position change  Encouraged to eat regular meals and keep well hydrated  May add extra salt to diet  - Tilt table study for further evaluation  - 2D echocardiogram to rule out any structural heart disease  - Holter monitor to evaluate rhythm  - Will follow up after testing complete  HPI:     Freedom Castaneda is a 30 y o  here for evaluation of syncope  Patient had a syncopal event on 2022  He was watching TV and felt like he was having a panic attack  Usually, when he has panic attack this, he feels better with walking and sitting in a squatting position  He stood up and got lightheaded and had a loss of consciousness  After 10 seconds, he returned to normal   He did not have any weakness or confusion afterwards  He had associated nausea and vomiting  Prior to event, he did not feel any palpitations or chest pain  He eats regular meals and drinks fluids  He was seen in the emergency room, workup was negative except for some mild sinus bradycardia with rate of 53  ECG was normal   Blood work was normal including blood sugar  No family history of SCD in first degree relatives         Past Medical History:   Diagnosis Date   • Anterior cervical adenopathy     Last Assessed [id number]   • Anxiety    • Lyme disease      Social History     Tobacco Use   • Smoking status: Former     Types: Cigarettes     Quit date: 2017     Years since quittin 9   • Smokeless tobacco: Current   Vaping Use   • Vaping Use: Every day   • Substances: Nicotine, THC, Flavoring   Substance Use Topics   • Alcohol use: No   • Drug use: Yes     Types: Marijuana      Family History   Problem Relation Age of Onset   • Cancer Mother    • No Known Problems Father    • Cancer Maternal Grandfather    • Cancer Paternal Grandfather      Past Surgical History:   Procedure Laterality Date   • WISDOM TOOTH EXTRACTION         Current Outpatient Medications:   •  Calcium Ascorbate (VITAMIN C) 500 mg tablet, Take 500 mg by mouth daily, Disp: , Rfl:   •  Multiple Vitamin (multivitamin) tablet, Take 1 tablet by mouth daily, Disp: , Rfl:   •  Naproxen Sodium (ALEVE PO), Take by mouth, Disp: , Rfl:   •  Nutritional Supplements (ANTIOXIDANTS PO), Take by mouth, Disp: , Rfl:   Allergies   Allergen Reactions   • Cheese - Food Allergy Nausea Only and GI Intolerance   • Doxycycline Nausea Only and Vomiting   • Tilactase GI Intolerance       Review of Systems:   Review of Systems   Respiratory: Negative for shortness of breath  Cardiovascular: Negative for chest pain, palpitations and leg swelling  Neurological: Positive for syncope  All other systems reviewed and are negative  Physical Examination:     Vitals:    11/18/22 1104 11/18/22 1113 11/18/22 1115   BP: 110/72 98/60 92/62   BP Location: Right arm Right arm Right arm   Patient Position: Supine Sitting Standing   Cuff Size: Standard Standard Standard   Pulse: 58 64 74   Temp: 98 6 °F (37 °C)     SpO2: 98%     Weight: 67 6 kg (149 lb)     Height: 5' 7" (1 702 m)         Physical Exam   Constitutional: He appears healthy  No distress  Eyes: Pupils are equal, round, and reactive to light  Conjunctivae are normal    Neck: No JVD present  Cardiovascular: Normal rate, regular rhythm and normal heart sounds  Exam reveals no gallop and no friction rub  No murmur heard  Pulmonary/Chest: Effort normal and breath sounds normal  He has no wheezes  He has no rales     Musculoskeletal: General: No tenderness, deformity or edema  Cervical back: Normal range of motion and neck supple  Neurological: He is alert and oriented to person, place, and time  Skin: Skin is warm and dry  Labs:     Lab Results   Component Value Date    WBC 10 38 (H) 10/16/2022    HGB 12 7 10/16/2022    HCT 36 7 10/16/2022    MCV 90 10/16/2022    RDW 11 9 10/16/2022     10/16/2022     BMP:  Lab Results   Component Value Date    SODIUM 136 10/16/2022    K 3 6 10/16/2022     10/16/2022    CO2 26 10/16/2022    BUN 19 10/16/2022    CREATININE 0 86 10/16/2022    GLUC 102 10/16/2022    CALCIUM 7 8 (L) 10/16/2022    EGFR 112 10/16/2022     LFT:  Lab Results   Component Value Date    AST 15 10/16/2022    ALT 20 10/16/2022    ALKPHOS 42 (L) 10/16/2022    TP 6 6 10/16/2022    ALB 3 9 10/16/2022      No results found for: [id number]  No results found for: HGBA1C  Lipid Profile:   No results found for: CHOLESTEROL, HDL, LDLCALC, TRIG  No results found for: CHOLESTEROL  No results found for: CKTOTAL, CKMB, CKMBINDEX, TROPONINI  No results found for: NTBNP   No results found for this or any previous visit (from the past 672 hour(s))  Imaging & Testing   I have personally reviewed pertinent reports  Cardiac Testing   No results found for this or any previous visit  EKG: Personally reviewed  NSR with rate [address] [facility] DO Cadet Paulton  [phone]  Please call with any questions

## 2022-11-21 PROBLEM — Z76.5 MALINGERER: Status: ACTIVE | Noted: 2022-11-21

## 2022-11-21 PROBLEM — F17.200 NICOTINE DEPENDENCE: Status: ACTIVE | Noted: 2017-03-24

## 2022-11-21 PROBLEM — F45.9 SOMATOFORM DISORDER: Status: ACTIVE | Noted: 2022-11-21

## 2022-11-21 PROBLEM — A69.20 LYME DISEASE: Status: ACTIVE | Noted: 2022-11-21

## 2022-11-21 PROBLEM — F09 COGNITIVE DISORDER: Status: ACTIVE | Noted: 2022-11-21

## 2022-11-21 PROBLEM — F43.12 CHRONIC POST-TRAUMATIC STRESS DISORDER (PTSD): Status: ACTIVE | Noted: 2022-11-21

## 2023-02-13 ENCOUNTER — HOSPITAL ENCOUNTER (OUTPATIENT)
Dept: NON INVASIVE DIAGNOSTICS | Facility: HOSPITAL | Age: 38
Discharge: HOME | End: 2023-02-13
Attending: INTERNAL MEDICINE